# Patient Record
Sex: FEMALE | Race: WHITE | NOT HISPANIC OR LATINO | Employment: UNEMPLOYED | URBAN - NONMETROPOLITAN AREA
[De-identification: names, ages, dates, MRNs, and addresses within clinical notes are randomized per-mention and may not be internally consistent; named-entity substitution may affect disease eponyms.]

---

## 2024-07-23 ENCOUNTER — TELEPHONE (OUTPATIENT)
Age: 44
End: 2024-07-23

## 2024-07-23 NOTE — TELEPHONE ENCOUNTER
[facility] received a fax that requires your attention. The document has been indexed to the patient’s chart for your review.      Reason for sending: CONTINUITY OF CARE    Documents Description: PROGRESS NOTES     Name of Sender: [facility]    Date Indexed: 12/09/2022, 09/19/2022, 04/14/2022, 07/21/2021, 12/09/2020, 12/01/2020      Notes (if needed): TRANSFER OF CARE RECORDS

## 2024-09-12 ENCOUNTER — OFFICE VISIT (OUTPATIENT)
Dept: INTERNAL MEDICINE | Facility: CLINIC | Age: 44
End: 2024-09-12
Payer: COMMERCIAL

## 2024-09-12 VITALS
RESPIRATION RATE: 17 BRPM | HEART RATE: 51 BPM | OXYGEN SATURATION: 100 % | TEMPERATURE: 97.5 F | WEIGHT: 113 LBS | SYSTOLIC BLOOD PRESSURE: 140 MMHG | BODY MASS INDEX: 19.29 KG/M2 | HEIGHT: 64 IN | DIASTOLIC BLOOD PRESSURE: 90 MMHG

## 2024-09-12 DIAGNOSIS — Z13.228 SCREENING FOR ENDOCRINE, NUTRITIONAL, METABOLIC AND IMMUNITY DISORDER: ICD-10-CM

## 2024-09-12 DIAGNOSIS — Z13.0 SCREENING FOR ENDOCRINE, NUTRITIONAL, METABOLIC AND IMMUNITY DISORDER: ICD-10-CM

## 2024-09-12 DIAGNOSIS — Z87.442 HISTORY OF KIDNEY STONES: ICD-10-CM

## 2024-09-12 DIAGNOSIS — Z12.31 ENCOUNTER FOR SCREENING MAMMOGRAM FOR MALIGNANT NEOPLASM OF BREAST: ICD-10-CM

## 2024-09-12 DIAGNOSIS — E55.9 VITAMIN D INSUFFICIENCY: ICD-10-CM

## 2024-09-12 DIAGNOSIS — R03.0 ELEVATED BLOOD PRESSURE READING: ICD-10-CM

## 2024-09-12 DIAGNOSIS — Z00.00 PHYSICAL EXAM, ANNUAL: Primary | ICD-10-CM

## 2024-09-12 DIAGNOSIS — Z13.21 SCREENING FOR ENDOCRINE, NUTRITIONAL, METABOLIC AND IMMUNITY DISORDER: ICD-10-CM

## 2024-09-12 DIAGNOSIS — Z13.29 SCREENING FOR ENDOCRINE, NUTRITIONAL, METABOLIC AND IMMUNITY DISORDER: ICD-10-CM

## 2024-09-12 LAB
25(OH)D3+25(OH)D2 SERPL-MCNC: 32.1 NG/ML (ref 30–100)
ALBUMIN SERPL-MCNC: 4.8 G/DL (ref 3.5–5.2)
ALBUMIN/GLOB SERPL: 1.7 G/DL
ALP SERPL-CCNC: 89 U/L (ref 39–117)
ALT SERPL-CCNC: 38 U/L (ref 1–33)
AST SERPL-CCNC: 31 U/L (ref 1–32)
BASOPHILS # BLD AUTO: 0.03 10*3/MM3 (ref 0–0.2)
BASOPHILS NFR BLD AUTO: 0.6 % (ref 0–1.5)
BILIRUB SERPL-MCNC: 0.7 MG/DL (ref 0–1.2)
BUN SERPL-MCNC: 12 MG/DL (ref 6–20)
BUN/CREAT SERPL: 14 (ref 7–25)
CALCIUM SERPL-MCNC: 9.3 MG/DL (ref 8.6–10.5)
CHLORIDE SERPL-SCNC: 104 MMOL/L (ref 98–107)
CHOLEST SERPL-MCNC: 177 MG/DL (ref 0–200)
CO2 SERPL-SCNC: 24.5 MMOL/L (ref 22–29)
CREAT SERPL-MCNC: 0.86 MG/DL (ref 0.57–1)
EGFRCR SERPLBLD CKD-EPI 2021: 85.6 ML/MIN/1.73
EOSINOPHIL # BLD AUTO: 0.09 10*3/MM3 (ref 0–0.4)
EOSINOPHIL NFR BLD AUTO: 1.8 % (ref 0.3–6.2)
ERYTHROCYTE [DISTWIDTH] IN BLOOD BY AUTOMATED COUNT: 12.1 % (ref 12.3–15.4)
FERRITIN SERPL-MCNC: 81.8 NG/ML (ref 13–150)
GLOBULIN SER CALC-MCNC: 2.8 GM/DL
GLUCOSE SERPL-MCNC: 85 MG/DL (ref 65–99)
HBA1C MFR BLD: 4.7 % (ref 4.8–5.6)
HCT VFR BLD AUTO: 42.7 % (ref 34–46.6)
HDLC SERPL-MCNC: 54 MG/DL (ref 40–60)
HGB BLD-MCNC: 14.5 G/DL (ref 12–15.9)
IMM GRANULOCYTES # BLD AUTO: 0.01 10*3/MM3 (ref 0–0.05)
IMM GRANULOCYTES NFR BLD AUTO: 0.2 % (ref 0–0.5)
IRON SATN MFR SERPL: 39 % (ref 20–50)
IRON SERPL-MCNC: 163 MCG/DL (ref 37–145)
LDLC SERPL CALC-MCNC: 109 MG/DL (ref 0–100)
LYMPHOCYTES # BLD AUTO: 1.19 10*3/MM3 (ref 0.7–3.1)
LYMPHOCYTES NFR BLD AUTO: 24.1 % (ref 19.6–45.3)
MCH RBC QN AUTO: 31.1 PG (ref 26.6–33)
MCHC RBC AUTO-ENTMCNC: 34 G/DL (ref 31.5–35.7)
MCV RBC AUTO: 91.6 FL (ref 79–97)
MONOCYTES # BLD AUTO: 0.31 10*3/MM3 (ref 0.1–0.9)
MONOCYTES NFR BLD AUTO: 6.3 % (ref 5–12)
NEUTROPHILS # BLD AUTO: 3.31 10*3/MM3 (ref 1.7–7)
NEUTROPHILS NFR BLD AUTO: 67 % (ref 42.7–76)
NRBC BLD AUTO-RTO: 0 /100 WBC (ref 0–0.2)
PLATELET # BLD AUTO: 230 10*3/MM3 (ref 140–450)
POTASSIUM SERPL-SCNC: 4 MMOL/L (ref 3.5–5.2)
PROT SERPL-MCNC: 7.6 G/DL (ref 6–8.5)
RBC # BLD AUTO: 4.66 10*6/MM3 (ref 3.77–5.28)
SODIUM SERPL-SCNC: 139 MMOL/L (ref 136–145)
T4 FREE SERPL-MCNC: 1.31 NG/DL (ref 0.92–1.68)
TIBC SERPL-MCNC: 417 MCG/DL
TRIGL SERPL-MCNC: 72 MG/DL (ref 0–150)
TSH SERPL DL<=0.005 MIU/L-ACNC: 1.06 UIU/ML (ref 0.27–4.2)
UIBC SERPL-MCNC: 254 MCG/DL (ref 112–346)
VIT B12 SERPL-MCNC: 711 PG/ML (ref 211–946)
VLDLC SERPL CALC-MCNC: 14 MG/DL (ref 5–40)
WBC # BLD AUTO: 4.94 10*3/MM3 (ref 3.4–10.8)

## 2024-09-12 PROCEDURE — 99386 PREV VISIT NEW AGE 40-64: CPT | Performed by: NURSE PRACTITIONER

## 2024-09-12 NOTE — PROGRESS NOTES
Chief Complaint   Patient presents with    Annual Exam       Mallorie De is a 44 y.o. female and is here for a comprehensive physical exam.   History of Present Illness  The patient is a 44-year-old female who presents to [facility] care and for an annual physical.    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 Her menstrual cycle began at age 13 and has been irregular for the past year, possibly due to stress from moving across the country. Previously, her periods were regular. She experienced iron deficiency during her second to last pregnancy due to significant blood loss during delivery. She has an intrauterine device (IUD) in place until 2029 and has always had periods with the IUD. She has never had an abnormal Pap smear. She does not take any non-prescribed herbs, supplements, calcium, or aspirin. She has no history of sexually transmitted diseases and has been monogamous with her  since becoming sexually active at 23. She has no history of abuse. She is up to date on dental checkups but has not had her vision checked in years. She has not had a mammogram. She does not bruise easily and has never been exposed to hepatitis. She occasionally feels the need for a bowel movement but does not have one, which she attributes to insufficient fiber intake. She has no issues with urination or bowel movements and sleeps on both sides.    Her blood pressure is slightly elevated, which she attributes to stress. She is fasting today and did not sleep well due to anxiety about the appointment. She has not exercised recently. She has experienced heart issues related to anxiety in the past.    She has had a sensation of something stuck in her throat for two years. She was prescribed Flonase when she lived in [place], and an ENT doctor suggested it would clear up, but it has not. She drinks about 40 ounces of water daily  and finds the condition annoying as she cannot clear her throat. She does not smoke and there is no mold in her house.    She has a history of kidney stones and wishes to consult a urologist. She used to see a urologist annually in [place] to monitor the size of her stones. She has had two kidney stones treated with lithotripsy. She has noticed blood in her urine, which she believes is related to her kidney stone issues. She is currently menstruating.    SOCIAL HISTORY  She has 7 children.    FAMILY HISTORY  Her mother had a hysterectomy.        History:  LMP: Patient's last menstrual period was 2024 (exact date).  Patient has ParaGard IUD  Last pap date: unsure  Abnormal pap? no  : 9   Para: 7  STD:none  Age of menarche:13  Sexually active:23  Abuse:none       Do you take any herbs or supplements that were not prescribed by a doctor? no  Are you taking calcium supplements? no  Are you taking aspirin daily? no      Health Habits:  Dental Exam. up to date  Eye Exam. not up to date - advised patient to schedule  Dermatology.not up to date - advised patient to schedule or closely monitor for changes in skin lesions  Exercise: 5 times/week.  Current exercise activities include: walking    Health Maintenance   Topic Date Due    MAMMOGRAM  Never done    ANNUAL PHYSICAL  Never done    INFLUENZA VACCINE  2024    COVID-19 Vaccine (2023- season) 2024 (Originally 2024)    HEPATITIS C SCREENING  2025 (Originally 2024)    PAP SMEAR  2025 (Originally 2024)    TDAP/TD VACCINES (2 - Td or Tdap) 2027    Pneumococcal Vaccine 0-64  Aged Out       PMH, PSH, SocHx, FamHx, Allergies, and Medications: Reviewed and updated in the Visit Navigator.     No Known Allergies  Past Medical History:   Diagnosis Date    Allergic     Anxiety     Headache     Kidney stone      Past Surgical History:   Procedure Laterality Date    KIDNEY STONE SURGERY      2 blasted    UMBILICAL  "HERNIA REPAIR  Feb. 2005     Social History     Socioeconomic History    Marital status:    Tobacco Use    Smoking status: Never    Smokeless tobacco: Never   Vaping Use    Vaping status: Never Used   Substance and Sexual Activity    Alcohol use: Never    Drug use: Never    Sexual activity: Yes     Partners: Male     Birth control/protection: I.U.D.     Family History   Problem Relation Age of Onset    Hearing loss Mother         started wearing hearing aids in her early 40s    Stroke Father         suffered mini strokes in late 50s/early 60s    Hearing loss Maternal Grandfather     Hearing loss Maternal Uncle        Review of Systems  Review of Systems      Vitals:    09/12/24 0837   BP: 140/90   Pulse: 51   Resp: 17   Temp: 97.5 °F (36.4 °C)   SpO2: 100%       Objective   /90   Pulse 51   Temp 97.5 °F (36.4 °C) (Temporal)   Resp 17   Ht 162.6 cm (64\")   Wt 51.3 kg (113 lb)   LMP 09/08/2024 (Exact Date)   SpO2 100%   BMI 19.40 kg/m²   BMI is within normal parameters. No other follow-up for BMI required.      Physical Exam  Vitals and nursing note reviewed.   Constitutional:       General: She is not in acute distress.     Appearance: Normal appearance. She is well-developed.   HENT:      Head: Normocephalic and atraumatic.      Right Ear: Hearing, tympanic membrane, ear canal and external ear normal. Tympanic membrane is erythematous and bulging.      Left Ear: Hearing, tympanic membrane, ear canal and external ear normal. Tympanic membrane is erythematous and bulging.      Nose: Nose normal. Mucosal edema present. No rhinorrhea.      Right Sinus: No maxillary sinus tenderness or frontal sinus tenderness.      Left Sinus: No maxillary sinus tenderness or frontal sinus tenderness.      Mouth/Throat:      Mouth: Mucous membranes are dry.      Dentition: Normal dentition.      Pharynx: Posterior oropharyngeal erythema present.      Comments: PND    Eyes:      Conjunctiva/sclera: Conjunctivae " normal.      Pupils: Pupils are equal, round, and reactive to light.   Neck:      Thyroid: No thyroid mass or thyromegaly.      Vascular: No carotid bruit or JVD.   Cardiovascular:      Rate and Rhythm: Regular rhythm. Bradycardia present.      Pulses: Normal pulses.      Heart sounds: Normal heart sounds, S1 normal and S2 normal. No murmur heard.  Pulmonary:      Effort: Pulmonary effort is normal. No respiratory distress.      Breath sounds: Normal breath sounds.   Abdominal:      General: Bowel sounds are normal. There is no distension or abdominal bruit.      Palpations: Abdomen is soft. There is no mass.      Tenderness: There is no abdominal tenderness. There is no right CVA tenderness, left CVA tenderness, guarding or rebound.      Hernia: No hernia is present.   Musculoskeletal:         General: Normal range of motion.      Cervical back: Normal range of motion and neck supple.   Lymphadenopathy:      Head:      Right side of head: No submental, submandibular or tonsillar adenopathy.      Left side of head: No submental, submandibular or tonsillar adenopathy.      Cervical: No cervical adenopathy.   Skin:     General: Skin is warm and dry.      Capillary Refill: Capillary refill takes less than 2 seconds.      Findings: No rash.      Nails: There is no clubbing.   Neurological:      Mental Status: She is alert and oriented to person, place, and time.      Cranial Nerves: No cranial nerve deficit.      Sensory: No sensory deficit.      Gait: Gait normal.   Psychiatric:         Behavior: Behavior normal.         Thought Content: Thought content normal.         Judgment: Judgment normal.        9/12/2024   Anxiety SAMAN-7    Feeling nervous, anxious or on edge 1    Not being able to stop or control worrying 0    Worrying too much about different things 0    Trouble Relaxing 1    Being so restless that it is hard to sit still 0    Becoming easily annoyed or irritable 0    Feeling afraid as if something awful might  happen 0    SAMAN 7 Total Score 2    If you checked any problems, how difficult have these problems made it for you to do your work, take care of things at home, or get along with other people Somewhat difficult      PHQ-9 Depression Screening  Little interest or pleasure in doing things? 0-->not at all   Feeling down, depressed, or hopeless? 0-->not at all   Trouble falling or staying asleep, or sleeping too much? 0-->not at all   Feeling tired or having little energy? 0-->not at all   Poor appetite or overeating? 0-->not at all   Feeling bad about yourself - or that you are a failure or have let yourself or your family down? 0-->not at all   Trouble concentrating on things, such as reading the newspaper or watching television? 0-->not at all   Moving or speaking so slowly that other people could have noticed? Or the opposite - being so fidgety or restless that you have been moving around a lot more than usual? 0-->not at all   Thoughts that you would be better off dead, or of hurting yourself in some way? 0-->not at all   PHQ-9 Total Score 0   If you checked off any problems, how difficult have these problems made it for you to do your work, take care of things at home, or get along with other people?              Assessment & Plan   1. Healthy female exam.    2. Patient Counseling: Including but not Limited to the following, when appropriate:  --Nutrition: Stressed importance of moderation in sodium/caffeine intake, saturated fat and cholesterol, caloric balance, sufficient intake of fresh fruits, vegetables, fiber, calcium, iron, and 1 mg of folate supplement per day (for females capable of pregnancy).  --Discussed the issue of estrogen replacement, calcium supplement, and the daily use of baby aspirin.  --Exercise: Stressed the importance of regular exercise.   --Substance Abuse: Discussed cessation/primary prevention of tobacco, alcohol, or other drug use; driving or other dangerous activities under the  influence; availability of treatment for abuse, as indicated based on social history.    --Sexuality: Discussed sexually transmitted diseases, partner selection, use of condoms, avoidance of unintended pregnancy  and contraceptive alternatives.   --Injury prevention: Discussed safety belts, safety helmets, smoke detector, smoking near bedding or upholstery.   --Dental health: Discussed importance of regular tooth brushing, flossing, and dental visits.  --Immunizations reviewed.  --Discussed benefits of colon cancer screening.      3. Discussed the patient's BMI with her.  The BMI is in the acceptable range  4. Return if symptoms worsen or fail to improve.  5. Age-appropriate Screening Scheduled      Assessment & Plan   Assessment & Plan  1. Annual physical.  A mammogram will be ordered. A colonoscopy is recommended when she turns 45 in 02/2025. Regular eye check-ups are advised. Blood work will be ordered to assess iron levels and thyroid function. She is encouraged to maintain good posture and perform regular breast self-examinations. Hydration is emphasized.    2. Hypertension.  Elevated blood pressure may be stress-related. Home monitoring of blood pressure is recommended. An EKG will be performed to ensure cardiac health.    3. Postnasal drip.  Increased water intake is advised to alleviate symptoms. If symptoms persist, allergy medication will be considered.    4. History of kidney stones.  A referral to a urologist will be made. Hydration is emphasized to prevent recurrence.      Diagnoses and all orders for this visit:    1. Physical exam, annual (Primary)  -     Comprehensive Metabolic Panel  -     Lipid Panel  -     CBC Auto Differential    2. Screening for endocrine, nutritional, metabolic and immunity disorder  -     Hemoglobin A1c  -     Vitamin B12  -     TSH  -     T4, Free  -     Iron and TIBC  -     Ferritin    3. Vitamin D insufficiency  -     Vitamin D,25-Hydroxy    4. Encounter for screening  mammogram for malignant neoplasm of breast  -     Mammo Screening Digital Tomosynthesis Bilateral With CAD    5. History of kidney stones  -     Ambulatory Referral to Urology    6. Elevated blood pressure reading           Patient or patient representative verbalized consent for the use of Ambient Listening during the visit with  HAILY Schafer for chart documentation.         HAILY Schafer 09/12/2024

## 2024-09-13 NOTE — PROGRESS NOTES
Please contact patient let her know that labs are unremarkable with exception of her vitamin D is on the lower end of normal she may want to take an over-the-counter 2000 daily vitamin D3 and in the wintertime recommend taking 4000.  Her iron level was elevated and we will continue to monitor.  Her LDL which is the bad cholesterol is slightly elevated but recommend dietary modifications along with exercise.  ALT slightly elevated but we will continue to monitor.

## 2024-09-20 ENCOUNTER — PATIENT ROUNDING (BHMG ONLY) (OUTPATIENT)
Dept: INTERNAL MEDICINE | Facility: CLINIC | Age: 44
End: 2024-09-20
Payer: COMMERCIAL

## 2025-01-24 ENCOUNTER — PATIENT ROUNDING (BHMG ONLY) (OUTPATIENT)
Dept: UROLOGY | Facility: CLINIC | Age: 45
End: 2025-01-24
Payer: COMMERCIAL

## 2025-01-24 ENCOUNTER — OFFICE VISIT (OUTPATIENT)
Dept: UROLOGY | Facility: CLINIC | Age: 45
End: 2025-01-24
Payer: COMMERCIAL

## 2025-01-24 VITALS
OXYGEN SATURATION: 99 % | HEIGHT: 64 IN | WEIGHT: 112 LBS | SYSTOLIC BLOOD PRESSURE: 112 MMHG | DIASTOLIC BLOOD PRESSURE: 70 MMHG | HEART RATE: 91 BPM | BODY MASS INDEX: 19.12 KG/M2 | TEMPERATURE: 98 F

## 2025-01-24 DIAGNOSIS — N20.0 KIDNEY STONES: Primary | ICD-10-CM

## 2025-01-24 LAB
BILIRUB BLD-MCNC: NEGATIVE MG/DL
CLARITY, POC: ABNORMAL
COLOR UR: ABNORMAL
EXPIRATION DATE: ABNORMAL
GLUCOSE UR STRIP-MCNC: NEGATIVE MG/DL
KETONES UR QL: NEGATIVE
LEUKOCYTE EST, POC: ABNORMAL
Lab: ABNORMAL
NITRITE UR-MCNC: NEGATIVE MG/ML
PH UR: 5.5 [PH] (ref 5–8)
PROT UR STRIP-MCNC: NEGATIVE MG/DL
RBC # UR STRIP: ABNORMAL /UL
SP GR UR: 1.03 (ref 1–1.03)
UROBILINOGEN UR QL: ABNORMAL

## 2025-01-24 NOTE — PROGRESS NOTES
Office Visit New Stone     Patient Name: Mallorie De  : 1980   MRN: [id number]     Chief Complaint: Kidney Stones.    Chief Complaint   Patient presents with    Flank Pain    Establish Care     Referring Provider: Estefani Moffett AP*    History of Present Illness: Mallorie De is a 44 y.o. female who presents to establish care due to history of nephrolithiasis.  Last stone surgery in .  No recent imaging.  Admits to having  occasional flank pain.  No UTI symptoms, no hematuria.      Stone prevention medications: none     Stone related history  Family history of stones:   yes father  Renal disease or anatomic abnormality: no  Malabsorptive disease or gastric bypass: no  Frequent UTI's    no  Parathyroid disease    no    Dietary Considerations  Soda - 0 per day  Fast food - 1 per week  Water - 40-50 ounces per day  no Adds salt to foods    Subjective      Review of System: ROS reviewed by me and is negative unless otherwise noted in HPI.      Past Medical History:   Past Medical History:   Diagnosis Date    Allergic     Anxiety     Headache     Kidney stone      Past Surgical History:   Past Surgical History:   Procedure Laterality Date    KIDNEY STONE SURGERY      2 blasted    UMBILICAL HERNIA REPAIR  2005     Family History:   Family History   Problem Relation Age of Onset    Hearing loss Mother         started wearing hearing aids in her early 40s    Stroke Father         suffered mini strokes in late 50s/early 60s    Hearing loss Maternal Grandfather     Hearing loss Maternal Uncle      Social History:   Social History     Socioeconomic History    Marital status:    Tobacco Use    Smoking status: Never     Passive exposure: Never    Smokeless tobacco: Never   Vaping Use    Vaping status: Never Used   Substance and Sexual Activity    Alcohol use: Never    Drug use: Never    Sexual activity: Yes     Partners: Male     Birth control/protection: I.U.D.       Medications:  "  No current outpatient medications on file.    Allergies:   No Known Allergies    Objective     Physical Exam:   Vital Signs:   Vitals:    01/24/25 1308   BP: 112/70   Pulse: 91   Temp: 98 °F (36.7 °C)   SpO2: 99%   Weight: 50.8 kg (112 lb)   Height: 162.6 cm (64\")     Body mass index is 19.22 kg/m².   Physical Exam  Vitals and nursing note reviewed.   Constitutional:       General: She is not in acute distress.     Appearance: Normal appearance. She is not ill-appearing.   HENT:      Head: Normocephalic and atraumatic.   Pulmonary:      Effort: Pulmonary effort is normal.   Skin:     General: Skin is warm and dry.   Neurological:      General: No focal deficit present.      Mental Status: She is alert and oriented to person, place, and time.   Psychiatric:         Mood and Affect: Mood normal.         Behavior: Behavior normal.          Labs  Lab Results   Component Value Date    GLUCOSE 85 09/12/2024    BUN 12 09/12/2024    CREATININE 0.86 09/12/2024    BCR 14.0 09/12/2024    K 4.0 09/12/2024    CO2 24.5 09/12/2024    CALCIUM 9.3 09/12/2024    PROTENTOTREF 7.6 09/12/2024    ALBUMIN 4.8 09/12/2024    LABIL2 1.7 09/12/2024    AST 31 09/12/2024    ALT 38 (H) 09/12/2024       Lab Results   Component Value Date    WBC 4.94 09/12/2024    HGB 14.5 09/12/2024    HCT 42.7 09/12/2024    MCV 91.6 09/12/2024     09/12/2024       No results found for: \"LDH\", \"URICACID\"    Lab Results   Component Value Date    CALCIUM 9.3 09/12/2024       Brief Urine Lab Results  (Last result in the past 365 days)        Color   Clarity   Blood   Leuk Est   Nitrite   Protein   CREAT   Urine HCG        01/24/25 1311 Dark Yellow   Hazy   Small   Trace   Negative   Negative                 Radiologic Studies  No Images in the past 120 days found.    I have personally reviewed these labs and images.    Assessment / Plan      Assessment  Mallorie De is a very pleasant 44 y.o. female who presents to establish care due to history of " nephrolithiasis.  Last stone surgery in 2022.  No recent imaging.  Admits to having  occasional flank pain.  No UTI symptoms, no hematuria.      My plan today is to get new imaging to evaluate for the presence of stones and have Ms. De follow up with me after to review results.  Urine is negative for nitrites.  It is very concentrated with specific gravity of >1.030. Discussed generalized stone prevention of 2.5 liters of fluids per day, decrease sodium intake, and less animal protein intake.    Will follow up with Mrs. De in 4-6 weeks or earlier should she have her CT imaging completed sooner.  Questions/concerns addressed.  She is agreeable to this plan today.     Diagnoses and all orders for this visit:    1. Kidney stones (Primary)  -     POC Urinalysis Dipstick, Automated  -     CT Abdomen Pelvis Stone Protocol; Future    Follow Up:   Return in about 4 weeks (around 2/21/2025) for Next scheduled follow up, with Muna to review CT imaging. .    HAILY Silverman, MSN, FNP-C  [facility]

## 2025-01-24 NOTE — PROGRESS NOTES
January 24, 2025    Hello, may I speak with Mallorie De?    My name is Shala.     I am  with [facility]  [address]  [address]  [facility] [address] [phone].    Before we get started may I verify your date of birth? 1980    I am calling to officially welcome you to our practice and ask about your recent visit. Is this a good time to talk? Unable to reach patient.    Tell me about your visit with us. What things went well?         We're always looking for ways to make our patients' experiences even better. Do you have recommendations on ways we may improve?      Overall were you satisfied with your first visit to our practice?        I appreciate you taking the time to speak with me today. Is there anything else I can do for you?       Thank you, and have a great day.

## 2025-02-05 ENCOUNTER — TELEPHONE (OUTPATIENT)
Dept: UROLOGY | Facility: CLINIC | Age: 45
End: 2025-02-05
Payer: COMMERCIAL

## 2025-02-05 NOTE — TELEPHONE ENCOUNTER
[facility] called and stated the CT STONE is not authorized yet and they stated it could take up to 3 weeks, [facility] will call back Monday 2/10 to let us know if it's auth.

## 2025-02-24 ENCOUNTER — HOSPITAL ENCOUNTER (OUTPATIENT)
Dept: CT IMAGING | Facility: HOSPITAL | Age: 45
Discharge: HOME | End: 2025-02-24
Admitting: NURSE PRACTITIONER
Payer: COMMERCIAL

## 2025-02-24 DIAGNOSIS — N20.0 KIDNEY STONES: ICD-10-CM

## 2025-02-24 PROCEDURE — 74176 CT ABD & PELVIS W/O CONTRAST: CPT

## 2025-02-25 ENCOUNTER — TELEMEDICINE (OUTPATIENT)
Dept: UROLOGY | Facility: CLINIC | Age: 45
End: 2025-02-25
Payer: COMMERCIAL

## 2025-02-25 DIAGNOSIS — N83.202 CYST OF LEFT OVARY: ICD-10-CM

## 2025-02-25 DIAGNOSIS — N20.0 KIDNEY STONES: Primary | ICD-10-CM

## 2025-02-25 PROCEDURE — 99214 OFFICE O/P EST MOD 30 MIN: CPT | Performed by: NURSE PRACTITIONER

## 2025-02-25 NOTE — PROGRESS NOTES
Office Visit     Patient Name: Mallorie De  : 1980   MRN: [id number]   Patient or patient representative verbalized consent for the use of Ambient Listening during the visit with  HAILY Navarro for chart documentation. 2025  08:43 EST    Chief Complaint   Patient presents with    Nephrolithiasis     Referring Provider: No ref. provider found    Primary Care Provider: Estefani Moffett APRN   Video visit conducted using video and audio.  Connected via [employer] Epic Video.   Location of patient: Her home in [facility]  Location of provider: [facility] office in [place], [place]  Patient has chosen to receive care through a telehealth visit.  The patient has signed the video visit consent form prior to starting visit and gave verbal consent prior to starting visit.   No technical issues occurred during this visit.     History of Present Illness  Mrs. De is a 45 year old female with history of nephrolithiasis who presents to review her CT imaging results today.      Kidney Stones  - No significant changes in health status since the last consultation.        Subjective   Review of System: As noted in HPI.    Past Medical History:   Diagnosis Date    Allergic     Anxiety     Headache     Kidney stone      Past Surgical History:   Procedure Laterality Date    KIDNEY STONE SURGERY      2 blasted    UMBILICAL HERNIA REPAIR  2005     Family History   Problem Relation Age of Onset    Hearing loss Mother         started wearing hearing aids in her early 40s    Stroke Father         suffered mini strokes in late 50s/early 60s    Hearing loss Maternal Grandfather     Hearing loss Maternal Uncle      Social History     Socioeconomic History    Marital status:    Tobacco Use    Smoking status: Never     Passive exposure: Never    Smokeless tobacco: Never   Vaping Use    Vaping status: Never Used   Substance and Sexual Activity    Alcohol use: Never    Drug use: Never    Sexual  activity: Yes     Partners: Male     Birth control/protection: I.U.D.     No current outpatient medications on file.  No Known Allergies  Objective   Visit Vitals  LMP 02/23/2025 (Exact Date)   Breastfeeding No      There is no height or weight on file to calculate BMI.   Physical Exam  Constitutional:       General: She is not in acute distress.     Appearance: Normal appearance. She is not ill-appearing.   HENT:      Head: Normocephalic and atraumatic.   Pulmonary:      Effort: Pulmonary effort is normal.   Neurological:      Mental Status: She is alert and oriented to person, place, and time.   Psychiatric:         Mood and Affect: Mood normal.         Behavior: Behavior normal.      Labs  Lab Results   Component Value Date    COLORU Dark Yellow 01/24/2025    CLARITYU Hazy (A) 01/24/2025    SPECGRAV 1.030 01/24/2025    PHUR 5.5 01/24/2025    LEUKOCYTESUR Trace (A) 01/24/2025    NITRITE Negative 01/24/2025    PROTEINPOCUA Negative 01/24/2025    GLUCOSEUR Negative 01/24/2025    KETONESU Negative 01/24/2025    UROBILINOGEN 0.2 E.U./dL 01/24/2025    BILIRUBINUR Negative 01/24/2025    RBCUR Small (A) 01/24/2025     Lab Results   Component Value Date    WBC 4.94 09/12/2024    HGB 14.5 09/12/2024    HCT 42.7 09/12/2024    MCV 91.6 09/12/2024     09/12/2024     Lab Results   Component Value Date    GLUCOSE 85 09/12/2024    CALCIUM 9.3 09/12/2024     09/12/2024    K 4.0 09/12/2024    CO2 24.5 09/12/2024     09/12/2024    BUN 12 09/12/2024    CREATININE 0.86 09/12/2024    EGFR 85.6 09/12/2024    BCR 14.0 09/12/2024    ALT 38 (H) 09/12/2024    AST 31 09/12/2024       Lab Results   Component Value Date    HGBA1C 4.70 (L) 09/12/2024       Lab Results   Component Value Date    WHUJ04RM 32.1 09/12/2024     Lab Results   Component Value Date    FERRITIN 81.80 09/12/2024    TSH 1.060 09/12/2024    FREET4 1.31 09/12/2024    LXLDRICW32 711 09/12/2024    QXKJ77ZM 32.1 09/12/2024     Radiographic Studies  CT  Abdomen Pelvis Stone Protocol  Result Date: 2/24/2025  Impression: 1.Bilateral nonobstructive nephrolithiasis. No evidence of hydronephrosis or obstructing stones. 2.Left ovarian cyst measuring up to 3.4 cm. Follow-up pelvic ultrasound in 3 to 6 weeks is advised    I have reviewed the above labs and imaging.     Assessment / Plan    Diagnoses and all orders for this visit:    1. Kidney stones (Primary)  -     Litholink 24-Hour Urine, Kidney Stone -; Future  -     PTH, Intact & Calcium; Future  -     Basic Metabolic Panel; Future  -     Uric Acid; Future  -     Vitamin D 1,25 Dihydroxy; Future    2. Cyst of left ovary  -     Ambulatory Referral to Gynecology       Assessment & Plan  1. Renal calculi  - CT scan revealed small, passable stones in both kidneys (approximately 3 in the left, 6 in the right)  - No surgical intervention needed  - Advise adequate hydration and limit salt intake  - Order Litholink 24-hour urine collection to identify dietary factors  - Check vitamin D, PTH, Uric acid, and BMP  - Continue current diet and fluid intake until Litholink test is completed    2. Left ovarian cyst  - Incidental finding of a 3.4 cm left ovarian cyst on CT scan  - Referral to gynecologist for further evaluation and management  - Recommend follow-up pelvic ultrasound in 3 to 6 weeks     Return in about 4 weeks (around 3/25/2025) for f/u with Muna to review labs and litholink results in [place].  .    Muna Hinojosa, MSN, APRN, FNP-C  [facility]

## 2025-03-19 ENCOUNTER — TELEPHONE (OUTPATIENT)
Dept: UROLOGY | Facility: CLINIC | Age: 45
End: 2025-03-19
Payer: COMMERCIAL

## 2025-03-19 NOTE — TELEPHONE ENCOUNTER
I called and left  for patient to ask about labs and litholink kit.  I have no results in epic. If patient hasn't completed labs by now, we need to reschedule appt until they are completed.  Ok to release HUB

## 2025-03-19 NOTE — TELEPHONE ENCOUNTER
Name: Mallorie De    Relationship: Self    Best Callback Number: [phone]    HUB PROVIDED THE RELAY MESSAGE FROM THE OFFICE   PATIENT HAS FURTHER QUESTIONS AND WOULD LIKE A CALL BACK    ADDITIONAL INFORMATION: PT MAILED LITHOLINK ABOUT ONE WEEK AGO. PT HAD OTHER LABS IN OCTOBER. SHE WANTS TO KNOW IF SHE NEEDS MORE RECENT LABS. PLEASE CALL PT TO ADVISE. THANK YOU

## 2025-03-21 ENCOUNTER — LAB (OUTPATIENT)
Dept: LAB | Facility: HOSPITAL | Age: 45
End: 2025-03-21
Payer: COMMERCIAL

## 2025-03-21 PROCEDURE — 82652 VIT D 1 25-DIHYDROXY: CPT | Performed by: NURSE PRACTITIONER

## 2025-03-21 PROCEDURE — 84550 ASSAY OF BLOOD/URIC ACID: CPT | Performed by: NURSE PRACTITIONER

## 2025-03-21 PROCEDURE — 80048 BASIC METABOLIC PNL TOTAL CA: CPT | Performed by: NURSE PRACTITIONER

## 2025-03-21 PROCEDURE — 83970 ASSAY OF PARATHORMONE: CPT | Performed by: NURSE PRACTITIONER

## 2025-04-09 ENCOUNTER — OFFICE VISIT (OUTPATIENT)
Dept: UROLOGY | Facility: CLINIC | Age: 45
End: 2025-04-09
Payer: COMMERCIAL

## 2025-04-09 VITALS
TEMPERATURE: 97.7 F | OXYGEN SATURATION: 100 % | HEART RATE: 86 BPM | WEIGHT: 112.8 LBS | BODY MASS INDEX: 19.36 KG/M2 | RESPIRATION RATE: 14 BRPM

## 2025-04-09 DIAGNOSIS — N20.0 KIDNEY STONES: Primary | ICD-10-CM

## 2025-04-09 RX ORDER — TAMSULOSIN HYDROCHLORIDE 0.4 MG/1
1 CAPSULE ORAL DAILY PRN
Qty: 30 CAPSULE | Refills: 2 | Status: SHIPPED | OUTPATIENT
Start: 2025-04-09

## 2025-04-09 NOTE — PROGRESS NOTES
Office Visit     Patient Name: Mallorie De  : 1980   MRN: [id number]   Patient or patient representative verbalized consent for the use of Ambient Listening during the visit with  HAILY Navarro for chart documentation. 2025  14:56 EDT    Chief Complaint   Patient presents with    kidney stone    Follow-up    Results     Labs and litholink      Referring Provider: No ref. provider found    Primary Care Provider: Estefani Moffett APRN     History of Present Illness  Mrs. De is a very pleasant 45 year old female with history of nephrolithiasis who presents to review her litholink results and associated lab work.      Kidney Stones  - She acknowledges inadequate fluid intake due to her busy schedule and long commutes.  - She abstains from soda consumption and prepares most meals at home but consumes foods with added sodium.    - She expresses concern about discomfort from passing kidney stones and inquires about Flomax and a safe analgesic.    Supplemental information: She is aware of Kegel exercises.    Subjective   Review of System: As noted in HPI.    Past Medical History:   Diagnosis Date    Allergic     Anxiety     Headache     Kidney stone      Past Surgical History:   Procedure Laterality Date    KIDNEY STONE SURGERY      2 blasted    UMBILICAL HERNIA REPAIR  2005     Family History   Problem Relation Age of Onset    Hearing loss Mother         started wearing hearing aids in her early 40s    Stroke Father         suffered mini strokes in late 50s/early 60s    Hearing loss Maternal Grandfather     Hearing loss Maternal Uncle      Social History     Socioeconomic History    Marital status:    Tobacco Use    Smoking status: Never     Passive exposure: Never    Smokeless tobacco: Never   Vaping Use    Vaping status: Never Used   Substance and Sexual Activity    Alcohol use: Never    Drug use: Never    Sexual activity: Yes     Partners: Male     Birth  control/protection: I.U.D.       Current Outpatient Medications:     tamsulosin (FLOMAX) 0.4 MG capsule 24 hr capsule, Take 1 capsule by mouth Daily As Needed (if suspected passage of renal stone)., Disp: 30 capsule, Rfl: 2    No Known Allergies  Objective   Visit Vitals  Pulse 86   Temp 97.7 °F (36.5 °C) (Temporal)   Resp 14   Wt 51.2 kg (112 lb 12.8 oz)   SpO2 100%   BMI 19.36 kg/m²      Body mass index is 19.36 kg/m².   Physical Exam  Vitals and nursing note reviewed.   Constitutional:       General: She is not in acute distress.     Appearance: Normal appearance. She is not ill-appearing.   HENT:      Head: Normocephalic and atraumatic.   Pulmonary:      Effort: Pulmonary effort is normal.   Skin:     General: Skin is warm and dry.   Neurological:      General: No focal deficit present.      Mental Status: She is alert and oriented to person, place, and time.   Psychiatric:         Mood and Affect: Mood normal.         Behavior: Behavior normal.      Labs  Lab Results   Component Value Date    COLORU Dark Yellow 01/24/2025    CLARITYU Hazy (A) 01/24/2025    SPECGRAV 1.030 01/24/2025    PHUR 5.5 01/24/2025    LEUKOCYTESUR Trace (A) 01/24/2025    NITRITE Negative 01/24/2025    PROTEINPOCUA Negative 01/24/2025    GLUCOSEUR Negative 01/24/2025    KETONESU Negative 01/24/2025    UROBILINOGEN 0.2 E.U./dL 01/24/2025    BILIRUBINUR Negative 01/24/2025    RBCUR Small (A) 01/24/2025     Lab Results   Component Value Date    WBC 4.94 09/12/2024    HGB 14.5 09/12/2024    HCT 42.7 09/12/2024    MCV 91.6 09/12/2024     09/12/2024     Lab Results   Component Value Date    GLUCOSE 78 03/21/2025    CALCIUM 9.3 03/21/2025    CALCIUM 9.3 03/21/2025     03/21/2025    K 4.9 03/21/2025    CO2 20.0 (L) 03/21/2025     03/21/2025    BUN 12 03/21/2025    BUN 12 09/12/2024    CREATININE 0.92 03/21/2025    CREATININE 0.86 09/12/2024    EGFR 78.4 03/21/2025    EGFR 85.6 09/12/2024    BCR 13.0 03/21/2025    ANIONGAP 12.0  03/21/2025    ALT 38 (H) 09/12/2024    AST 31 09/12/2024     Lab Results   Component Value Date    HGBA1C 4.70 (L) 09/12/2024     Lab Results   Component Value Date    HTDZ71VB 32.1 09/12/2024    PTH 18.4 03/21/2025    URICACID 3.1 03/21/2025     Lab Results   Component Value Date    FERRITIN 81.80 09/12/2024    TSH 1.060 09/12/2024    FREET4 1.31 09/12/2024    WGQLVAVQ99 711 09/12/2024    HYWX57UE 32.1 09/12/2024       Radiographic Studies  CT Abdomen Pelvis Stone Protocol  Result Date: 2/24/2025  Impression: 1.Bilateral nonobstructive nephrolithiasis. No evidence of hydronephrosis or obstructing stones. 2.Left ovarian cyst measuring up to 3.4 cm. Follow-up pelvic ultrasound in 3 to 6 weeks is advised Electronically Signed: Deondre Foss MD  2/24/2025 12:16 PM EST  Workstation ID: [id number]    I have reviewed the above labs and imaging.     Assessment / Plan    Diagnoses and all orders for this visit:    1. Kidney stones (Primary)  -     tamsulosin (FLOMAX) 0.4 MG capsule 24 hr capsule; Take 1 capsule by mouth Daily As Needed (if suspected passage of renal stone).  Dispense: 30 capsule; Refill: 2  -     CT Abdomen Pelvis Stone Protocol; Future  -     Litholink 24-Hour Urine, Kidney Stone -; Future       Assessment & Plan  1. Nephrolithiasis  - Low urine volume, borderline elevated urine calcium, and borderline hypocitraturia contribute to her condition  - Increase fluid intake to 2.5 liters daily, primarily water  - Add 4 ounces of lemon concentrate to enhance urine acidity  - Reduce dietary sodium, home-prepared meals, lower salt alternatives, and limited fast food consumption  - Recommended bladder training exercises and pelvic floor physical therapy  - Prescribed Flomax as needed  - Ordered CT scan, which she may cancel if unnecessary in 6 months.   - Repeat Litholink test in 6 months  - Address overactivity symptoms if they arise with increased water intake  - Contact office if stronger analgesics are needed  or if suspected passage of renal stone.       Return in about 6 months (around 10/9/2025) for f/u with Muna to review litholink and CT imaging or earlier if suspects passage of a stone. .    Muna Hinojosa, MSN, APRN, FNP-C  [facility] [place]

## 2025-05-07 ENCOUNTER — OFFICE VISIT (OUTPATIENT)
Dept: OBSTETRICS AND GYNECOLOGY | Facility: CLINIC | Age: 45
End: 2025-05-07
Payer: COMMERCIAL

## 2025-05-07 VITALS
HEIGHT: 64 IN | BODY MASS INDEX: 19.53 KG/M2 | WEIGHT: 114.4 LBS | DIASTOLIC BLOOD PRESSURE: 70 MMHG | SYSTOLIC BLOOD PRESSURE: 102 MMHG

## 2025-05-07 DIAGNOSIS — Z12.4 SCREENING FOR CERVICAL CANCER: ICD-10-CM

## 2025-05-07 DIAGNOSIS — Z87.42 HISTORY OF OVARIAN CYST: Primary | ICD-10-CM

## 2025-05-07 NOTE — PATIENT INSTRUCTIONS
Encourage patient to consider scheduling screening mammogram  Will contact with pap results when availble

## 2025-05-07 NOTE — PROGRESS NOTES
Subjective   Chief Complaint   Patient presents with    Ovarian Cyst     Referral for Cyst of left ovary-TVS done  Last pap done 5-6 years ago       Mallorie De is a 45 y.o. year old  presenting to be seen for left ovarian cyst noted on pelvic ultrasound in February.  Patient was being evaluated for kidney stones and CT noted 3.4 cm left ovary cyst  Has paragard IUD placed . Has monthly bleeds with paragard  Transvaginal ultrasound is done today and reviewed with patient.  IUD is properly positioned in the uterus.  Bilateral ovaries are normal with small follicles.  Previously seen left ovarian cyst has resolved.  Patient has never had a mammogram and she declines screening mammogram at this time  ..Patient's last menstrual period was 2025 (approximate).     Past Medical History:   Diagnosis Date    Allergic     Anxiety     Headache     Kidney stone     Ovarian cyst 2025    Varicella 1980        Current Outpatient Medications:     tamsulosin (FLOMAX) 0.4 MG capsule 24 hr capsule, Take 1 capsule by mouth Daily As Needed (if suspected passage of renal stone)., Disp: 30 capsule, Rfl: 2   No Known Allergies   Past Surgical History:   Procedure Laterality Date    KIDNEY STONE SURGERY      2 blasted    UMBILICAL HERNIA REPAIR  2005      Social History     Socioeconomic History    Marital status:    Tobacco Use    Smoking status: Never     Passive exposure: Never    Smokeless tobacco: Never   Vaping Use    Vaping status: Never Used   Substance and Sexual Activity    Alcohol use: Never    Drug use: Never    Sexual activity: Yes     Partners: Male     Birth control/protection: I.U.D.      Family History   Problem Relation Age of Onset    Hearing loss Mother         started wearing hearing aids in her early 40s    Stroke Father         suffered mini strokes in late 50s/early 60s    Hearing loss Maternal Grandfather     Hearing loss Maternal Uncle     Breast cancer Maternal Aunt   "      Review of Systems   Constitutional: Negative.    Gastrointestinal: Negative.    Genitourinary: Negative.            Objective   /70   Ht 162.6 cm (64\")   Wt 51.9 kg (114 lb 6.4 oz)   LMP 04/16/2025 (Approximate)   BMI 19.64 kg/m²     Physical Exam  Exam conducted with a chaperone present.   Constitutional:       Appearance: Normal appearance. She is well-developed and well-groomed.   Eyes:      General: Lids are normal.      Extraocular Movements: Extraocular movements intact.      Conjunctiva/sclera: Conjunctivae normal.   Genitourinary:     Labia:         Right: No rash, tenderness or lesion.         Left: No rash, tenderness or lesion.       Urethra: No prolapse, urethral pain, urethral swelling or urethral lesion.      Vagina: No vaginal discharge, erythema, tenderness, bleeding or lesions.      Cervix: No cervical motion tenderness, discharge, friability, lesion or cervical bleeding.      Comments: IUD strings 3 cm  Neurological:      General: No focal deficit present.      Mental Status: She is alert and oriented to person, place, and time.   Psychiatric:         Attention and Perception: Attention normal.         Mood and Affect: Mood normal.         Speech: Speech normal.         Behavior: Behavior is cooperative.            Result Review :           US Non-ob Transvaginal (05/07/2025 09:58)         Assessment and Plan  Diagnoses and all orders for this visit:    1. History of ovarian cyst (Primary)  -     US Non-ob Transvaginal    2. Screening for cervical cancer  -     LIQUID-BASED PAP SMEAR WITH HPV GENOTYPING REGARDLESS OF INTERPRETATION (MELIZA,COR,MAD)      Patient Instructions   Encourage patient to consider scheduling screening mammogram  Will contact with pap results when availble           This note was electronically signed.    Shala Wilks PA-C   May 7, 2025  "

## 2025-05-09 LAB — REF LAB TEST METHOD: NORMAL
